# Patient Record
(demographics unavailable — no encounter records)

---

## 2025-02-28 NOTE — HEALTH RISK ASSESSMENT
[Never (0 pts)] : Never (0 points) [No] : In the past 12 months have you used drugs other than those required for medical reasons? No [One fall no injury in past year] : Patient reported one fall in the past year without injury [Little interest or pleasure doing things] : 1) Little interest or pleasure doing things [2] : 1) Little interest or pleasure doing things for more than half of the days (2) [Feeling down, depressed, or hopeless] : 2) Feeling down, depressed, or hopeless [1] : 2) Feeling down, depressed, or hopeless for several days (1) [Never] : Never [PHQ-2 Positive] : PHQ-2 Positive [PHQ-9 Positive] : PHQ-9 Positive

## 2025-04-03 NOTE — REVIEW OF SYSTEMS
[Night Sweats] : night sweats [Negative] : Gastrointestinal [Incontinence] : no incontinence [Abn Vaginal bleeding] : no abnormal vaginal bleeding [Breast Pain] : no breast pain [de-identified] : concerns for bump

## 2025-04-03 NOTE — REVIEW OF SYSTEMS
[Night Sweats] : night sweats [Negative] : Gastrointestinal [Incontinence] : no incontinence [Abn Vaginal bleeding] : no abnormal vaginal bleeding [Breast Pain] : no breast pain [de-identified] : concerns for bump

## 2025-04-03 NOTE — HISTORY OF PRESENT ILLNESS
[FreeTextEntry1] : 25 year old female, currently menstruating, presents for repeat GYN visit for chronic pelvic pain. Seen by GYN last on 12/7/23. At that time had chronic severe pelvic pain. TVUS in 2023 showed a 5 x 4 x 4.5 cm left ovarian cyst. Pain increases during menses. During period, bleeds through 5-10 tampons/day x 7 days.   Pt Also with hx of spinal fusion and chronic back pain. Takes baclofen, percocet, and tylenol daily for pain. Has occasional burning on skin after urination. ReportsPatient HIV+, compliant with Bictarvy treatment. Also with history of gonorrhea, chlamydia, syphilis, herpes infections. Today, asking about status of cyst identified at previous visits.   During visit ______.